# Patient Record
Sex: FEMALE | Race: WHITE | NOT HISPANIC OR LATINO | Employment: UNEMPLOYED | URBAN - METROPOLITAN AREA
[De-identification: names, ages, dates, MRNs, and addresses within clinical notes are randomized per-mention and may not be internally consistent; named-entity substitution may affect disease eponyms.]

---
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
## 2023-02-28 PROBLEM — O99.213 OBESITY COMPLICATING PREGNANCY, THIRD TRIMESTER: Status: ACTIVE | Noted: 2023-02-28

## 2023-02-28 PROBLEM — Z34.92 PREGNANT AND NOT YET DELIVERED IN SECOND TRIMESTER: Status: RESOLVED | Noted: 2022-11-11 | Resolved: 2023-02-28

## 2023-02-28 PROBLEM — O13.3 GESTATIONAL HYPERTENSION, THIRD TRIMESTER: Status: ACTIVE | Noted: 2023-02-28

## 2023-03-01 PROBLEM — K59.00 CONSTIPATION: Status: RESOLVED | Noted: 2019-09-25 | Resolved: 2023-03-01

## 2023-03-01 PROBLEM — N32.81 OVERACTIVE BLADDER: Status: RESOLVED | Noted: 2019-09-25 | Resolved: 2023-03-01

## 2023-03-01 PROBLEM — R31.29 MICROSCOPIC HEMATURIA: Status: RESOLVED | Noted: 2019-09-25 | Resolved: 2023-03-01

## 2023-06-05 PROBLEM — O13.3 GESTATIONAL HYPERTENSION, THIRD TRIMESTER: Status: RESOLVED | Noted: 2023-02-28 | Resolved: 2023-06-05

## 2024-04-05 ENCOUNTER — OCCUPATIONAL HEALTH (OUTPATIENT)
Dept: URGENT CARE | Facility: CLINIC | Age: 19
End: 2024-04-05

## 2024-04-05 DIAGNOSIS — Z02.83 ENCOUNTER FOR DRUG SCREENING: Primary | ICD-10-CM

## 2024-04-05 PROCEDURE — 80305 DRUG TEST PRSMV DIR OPT OBS: CPT | Mod: S$GLB,,,

## 2024-04-05 NOTE — PROGRESS NOTES
Patient presented to clinic for a Random DCFS drug screen. The following were collected Hair and urine. Urine was observed.

## 2024-05-29 ENCOUNTER — OCCUPATIONAL HEALTH (OUTPATIENT)
Dept: URGENT CARE | Facility: CLINIC | Age: 19
End: 2024-05-29

## 2024-05-29 DIAGNOSIS — Z02.83 ENCOUNTER FOR DRUG SCREENING: Primary | ICD-10-CM

## 2024-05-29 PROCEDURE — 80305 DRUG TEST PRSMV DIR OPT OBS: CPT | Mod: S$GLB,,, | Performed by: FAMILY MEDICINE

## 2024-07-29 ENCOUNTER — OCCUPATIONAL HEALTH (OUTPATIENT)
Dept: URGENT CARE | Facility: CLINIC | Age: 19
End: 2024-07-29

## 2024-07-29 DIAGNOSIS — Z02.83 ENCOUNTER FOR DRUG SCREENING: Primary | ICD-10-CM

## 2024-07-29 NOTE — PROGRESS NOTES
"Patient presented to clinic for a Random DCFS drug screen. The following were collected Hair, Urine, and Oral. Pt failed to provide a specimen , shy bladder process was started at 1:59 pm. Patient tried to provide another sample and failed to provide at 2:36 pm patient decided to just end the test , stating " I wont be able to do it".    "

## 2024-10-28 ENCOUNTER — OCCUPATIONAL HEALTH (OUTPATIENT)
Dept: URGENT CARE | Facility: CLINIC | Age: 19
End: 2024-10-28

## 2024-10-28 DIAGNOSIS — Z02.83 ENCOUNTER FOR DRUG SCREENING: Primary | ICD-10-CM

## 2024-10-28 PROCEDURE — 80305 DRUG TEST PRSMV DIR OPT OBS: CPT | Mod: S$GLB,,,

## 2024-10-28 NOTE — PROGRESS NOTES
Patient presented to clinic for a Random DCFS drug screen. The following were collected Hair, observed Urine, and Oral. [facility] verified by mg. [facility] francy verified by mb.

## 2024-12-20 ENCOUNTER — OCCUPATIONAL HEALTH (OUTPATIENT)
Dept: URGENT CARE | Facility: CLINIC | Age: 19
End: 2024-12-20

## 2024-12-20 DIAGNOSIS — Z02.83 ENCOUNTER FOR DRUG SCREENING: Primary | ICD-10-CM

## 2024-12-20 NOTE — PROGRESS NOTES
Patient presented to clinic for a Random DCFS drug screen. The following were collected Hair, Urine, and Oral observed. Verified by Kacy

## 2024-12-20 NOTE — LETTER
December 20, 2024      [facility]  [address], [address]  [place] [address]  Phone: [phone]  Fax: [phone]       Patient: Steffi Courtney   YOB: 2005  Date of Visit: 12/20/2024    To Whom It May Concern:    Lashell Courtney  was at [facility] on 12/20/2024. The patient may return to work/school on 12/21/2024 with no restrictions. If you have any questions or concerns, or if I can be of further assistance, please do not hesitate to contact me.    Sincerely,    Kacy Segura MA

## 2025-01-29 ENCOUNTER — OCCUPATIONAL HEALTH (OUTPATIENT)
Dept: URGENT CARE | Facility: CLINIC | Age: 20
End: 2025-01-29

## 2025-01-29 DIAGNOSIS — Z02.83 ENCOUNTER FOR DRUG SCREENING: Primary | ICD-10-CM

## 2025-01-29 PROCEDURE — 80305 DRUG TEST PRSMV DIR OPT OBS: CPT | Mod: S$GLB,,, | Performed by: FAMILY MEDICINE

## 2025-01-29 NOTE — PROGRESS NOTES
Patient presented to clinic for a Random DCFS drug screen. The following were collected Hair, Urine, and Oral. Verified by Monique RAINEY